# Patient Record
Sex: MALE | Race: WHITE | Employment: FULL TIME | URBAN - METROPOLITAN AREA
[De-identification: names, ages, dates, MRNs, and addresses within clinical notes are randomized per-mention and may not be internally consistent; named-entity substitution may affect disease eponyms.]

---

## 2018-10-19 ENCOUNTER — OFFICE VISIT (OUTPATIENT)
Dept: FAMILY MEDICINE | Facility: CLINIC | Age: 33
End: 2018-10-19
Payer: COMMERCIAL

## 2018-10-19 DIAGNOSIS — Z23 NEED FOR PROPHYLACTIC VACCINATION AND INOCULATION AGAINST INFLUENZA: ICD-10-CM

## 2018-10-19 DIAGNOSIS — L21.9 SEBORRHEIC DERMATITIS: Primary | ICD-10-CM

## 2018-10-19 DIAGNOSIS — Z23 NEED FOR PROPHYLACTIC VACCINATION WITH TETANUS-DIPHTHERIA (TD): ICD-10-CM

## 2018-10-19 DIAGNOSIS — R21 SKIN RASH: ICD-10-CM

## 2018-10-19 PROCEDURE — 99213 OFFICE O/P EST LOW 20 MIN: CPT | Performed by: FAMILY MEDICINE

## 2018-10-19 RX ORDER — CLOBETASOL PROPIONATE 0.5 MG/ML
SOLUTION TOPICAL
Refills: 3 | COMMUNITY
Start: 2017-12-22 | End: 2018-10-19

## 2018-10-19 RX ORDER — CLOBETASOL PROPIONATE 0.5 MG/ML
SOLUTION TOPICAL
Qty: 100 ML | Refills: 3 | Status: SHIPPED | OUTPATIENT
Start: 2018-10-19 | End: 2019-12-29

## 2018-10-19 RX ORDER — KETOCONAZOLE 20 MG/ML
SHAMPOO TOPICAL
Qty: 120 ML | Refills: 1 | Status: SHIPPED | OUTPATIENT
Start: 2018-10-19 | End: 2021-06-01

## 2018-10-19 NOTE — PATIENT INSTRUCTIONS
[facility]    If you have any questions regarding to your visit please contact your care team:       Team Purple:   Clinic Hours Telephone Number   Dr. Ute Burton   7am-7pm  Monday - Thursday   7am-5pm  Fridays  [phone]  (Appointment scheduling available 24/7)   Urgent Care - [place] and [facility] - 11am-9pm Monday-Friday Saturday-Sunday- 9am-5pm   [place] - 5pm-9pm Monday-Friday Saturday-Sunday- 9am-5pm  [phone] - [place]  [phone] - [place]       What options do I have for a visit other than an office visit? We offer electronic visits (e-visits) and telephone visits, when medically appropriate.  Please check with your medical insurance to see if these types of visits are covered, as you will be responsible for any charges that are not paid by your insurance.      You can use [employer] (secure electronic communication) to access to your chart, send your primary care provider a message, or make an appointment. Ask a team member how to get started.     For a price quote for your services, please call our Consumer Price Line at [phone] or our Imaging Cost estimation line at [phone] (for imaging tests).      ERIC Herrera

## 2018-10-19 NOTE — MR AVS SNAPSHOT
After Visit Summary   10/19/2018    Adam Alexander    MRN: [id number]           Patient Information     Date Of Birth          1985        Visit Information        Provider Department      10/19/2018 8:20 AM Jeevan Morrison MD [facility]        Today's Diagnoses     Seborrheic dermatitis    -  1    Need for prophylactic vaccination and inoculation against influenza        Need for prophylactic vaccination with tetanus-diphtheria (Td)        Skin rash          Care Instructions    [facility]    If you have any questions regarding to your visit please contact your care team:       Team Purple:   Clinic Hours Telephone Number   Dr. Ute Burton   7am-7pm  Monday - Thursday   7am-5pm  Fridays  [phone]  (Appointment scheduling available 24/7)   Urgent Care - [place] - 11am-9pm Monday-Friday Saturday-Sunday- 9am-5pm   [place] - 5pm-9pm Monday-Friday Saturday-Sunday- 9am-5pm  [phone] - [place]  [phone] [facility]       What options do I have for a visit other than an office visit? We offer electronic visits (e-visits) and telephone visits, when medically appropriate.  Please check with your medical insurance to see if these types of visits are covered, as you will be responsible for any charges that are not paid by your insurance.      You can use [email or web address] (secure electronic communication) to access to your chart, send your primary care provider a message, or make an appointment. Ask a team member how to get started.     For a price quote for your services, please call our Consumer Price Line at [phone] or our Imaging Cost estimation line at [phone] (for imaging tests).      ERIC Herrera            Follow-ups after your visit        Who to contact     If you have questions or need follow up information about today's clinic visit or your schedule please contact  [facility] directly at [phone].  Normal or non-critical lab and imaging results will be communicated to you by MyChart, letter or phone within 4 business days after the clinic has received the results. If you do not hear from us within 7 days, please contact the clinic through MyChart or phone. If you have a critical or abnormal lab result, we will notify you by phone as soon as possible.  Submit refill requests through [employer] or call your pharmacy and they will forward the refill request to us. Please allow 3 business days for your refill to be completed.          Additional Information About Your Visit        Care EveryWhere ID     This is your Care EveryWhere ID. This could be used by other organizations to access your [place] medical records  [id number]         Blood Pressure from Last 3 Encounters:   01/07/16 133/72   12/22/15 120/79   09/23/15 124/79    Weight from Last 3 Encounters:   01/07/16 148 lb 3.2 oz (67.2 kg)   12/22/15 150 lb (68 kg)   06/22/15 158 lb (71.7 kg)              Today, you had the following     No orders found for display         Today's Medication Changes          These changes are accurate as of 10/19/18  8:38 AM.  If you have any questions, ask your nurse or doctor.               Stop taking these medicines if you haven't already. Please contact your care team if you have questions.     desonide 0.05 % cream   Commonly known as:  DESOWEN                Where to get your medicines      These medications were sent to [facility] - [place], [place] - [address] [address]  [address] [address] [address]    Hours:  24-hours Phone:  [phone]     clobetasol 0.05 % external solution    ketoconazole 2 % shampoo                Primary Care Provider Office Phone # Fax #    [facility] [phone] [address] [address]        Equal Access to Services      KHRIS ERAZO : Hadii aad ku litzy Richter, waaxda luqadaha, qaybta kaalzoraida brothers, hermes parth ashwinnatty jamestamracaroline pro. [facility] [phone].    ATENCIÓN: Si habla español, tiene a bowers disposición servicios gratuitos de asistencia lingüística. Llame al [phone].    We comply with applicable federal civil rights laws and [place] laws. We do not discriminate on the basis of race, color, national origin, age, disability, sex, sexual orientation, or gender identity.            Thank you!     Thank you for choosing [facility] [place]  for your care. Our goal is always to provide you with excellent care. Hearing back from our patients is one way we can continue to improve our services. Please take a few minutes to complete the written survey that you may receive in the mail after your visit with us. Thank you!             Your Updated Medication List - Protect others around you: Learn how to safely use, store and throw away your medicines at www.disposemymeds.org.          This list is accurate as of 10/19/18  8:38 AM.  Always use your most recent med list.                   Brand Name Dispense Instructions for use Diagnosis    betamethasone (augmented) 0.05 % lotion    DIPROLENE          clobetasol 0.05 % external solution    TEMOVATE    100 mL    LETICIA TOPICALLY BID TO SCALP UNDER WET DRESSINGS    Seborrheic dermatitis       ketoconazole 2 % shampoo    NIZORAL    120 mL    Apply to the affected area and wash off after 5 minutes.    Seborrheic dermatitis

## 2018-10-19 NOTE — PROGRESS NOTES
SUBJECTIVE:   Adam Alexander is a 33 year old male who presents to clinic today for the following health issues:    Chief Complaint   Patient presents with     Derm Problem     scalp on face and scabs for years, comes and go. Pt reports itchy scabs, scalp under hair, dryness, rediness and irritated scab. Pt also reports he was treating for this in the past. He used clobetasol (TEMOVATE) 0.05 % external  witch helped with the scalp. Pt is wanted refill on (TEMOVATE)      Health Maintenance     PHQ-2, Tetanus immunization, influenza     Has had skin issues since 2014 and been treated with different medications, only Clobetasol seems to help. Has build up in the scalp and the face gets dry, flaky and itchy. It comes and goes. Seen in [facility] dermatologist; clobetasol and other lotions.    Problem list and histories reviewed & adjusted, as indicated.  Additional history: as documented    Patient Active Problem List   Diagnosis     Hyperlipidemia with target LDL less than 160     AD (atopic dermatitis)     Past Surgical History:   Procedure Laterality Date     MYRINGOTOMY, INSERT TUBE BILATERAL, COMBINED         Social History   Substance Use Topics     Smoking status: Never Smoker     Smokeless tobacco: Never Used     Alcohol use Yes      Comment: occ     Family History   Problem Relation Age of Onset     Hyperlipidemia Father          ROS:  Constitutional, HEENT, cardiovascular, pulmonary, gi and gu systems are negative, except as otherwise noted.    OBJECTIVE:     /72  There is no height or weight on file to calculate BMI.  GENERAL: healthy, alert and no distress  RESP: lungs clear to auscultation - no rales, rhonchi or wheezes  CV: regular rate and rhythm, normal S1 S2, no S3 or S4, no murmur, click or rub  SKIN:  Scaly dry skin: scalp and face - face and scalp    Diagnostic Test Results:  none     ASSESSMENT/PLAN:     (L21.9) Seborrheic dermatitis  (primary encounter diagnosis)  Comment: Consistent  with seborrheic dermatitis/Xerosis. Discussed use of Clobetasol and Nizoral shampoo. Keep skin moist  Plan: ketoconazole (NIZORAL) 2 % shampoo, clobetasol         (TEMOVATE) 0.05 % external solution    (Z23) Need for prophylactic vaccination and inoculation against influenza  (Z23) Need for prophylactic vaccination with tetanus-diphtheria (Td)    Jeevan Morrison MD  [facility]

## 2018-10-21 VITALS — DIASTOLIC BLOOD PRESSURE: 72 MMHG | SYSTOLIC BLOOD PRESSURE: 130 MMHG

## 2019-02-26 ENCOUNTER — OFFICE VISIT (OUTPATIENT)
Dept: FAMILY MEDICINE | Facility: CLINIC | Age: 34
End: 2019-02-26
Payer: COMMERCIAL

## 2019-02-26 VITALS
WEIGHT: 164 LBS | SYSTOLIC BLOOD PRESSURE: 104 MMHG | TEMPERATURE: 97 F | DIASTOLIC BLOOD PRESSURE: 64 MMHG | HEIGHT: 70 IN | OXYGEN SATURATION: 100 % | RESPIRATION RATE: 12 BRPM | HEART RATE: 95 BPM | BODY MASS INDEX: 23.48 KG/M2

## 2019-02-26 DIAGNOSIS — H93.8X3 PLUGGED FEELING IN EAR, BILATERAL: Primary | ICD-10-CM

## 2019-02-26 DIAGNOSIS — T16.2XXA FOREIGN BODY OF LEFT EAR, INITIAL ENCOUNTER: ICD-10-CM

## 2019-02-26 PROCEDURE — 99213 OFFICE O/P EST LOW 20 MIN: CPT | Performed by: FAMILY MEDICINE

## 2019-02-26 ASSESSMENT — MIFFLIN-ST. JEOR: SCORE: 1700.15

## 2019-02-26 NOTE — NURSING NOTE
"Chief Complaint   Patient presents with     Derm Problem     Initial /64 (BP Location: Left arm, Patient Position: Chair, Cuff Size: Adult Regular)   Pulse 95   Temp 97  F (36.1  C) (Oral)   Resp 12   Ht 1.778 m (5' 10\")   Wt 74.4 kg (164 lb)   SpO2 100%   BMI 23.53 kg/m   Estimated body mass index is 23.53 kg/m  as calculated from the following:    Height as of this encounter: 1.778 m (5' 10\").    Weight as of this encounter: 74.4 kg (164 lb).  BP completed using cuff size: regular    Maurizio Hopper  "

## 2019-02-26 NOTE — PROGRESS NOTES
"  SUBJECTIVE:   Adam Alexander is a 33 year old male who presents to clinic today for the following health issues:    Chief Complaint   Patient presents with     Derm Problem     Swelling thats effecting the ears feeling plugged    Has had similar symptoms in the past, got ear drops and that helped.    At this time ears feeling plugged up for past several weeks  No pain, hearing decreased.  No cough, nasal congestion or seasonal allergies.  He uses Q tips to clean his ears.    Problem list and histories reviewed & adjusted, as indicated.  Additional history: as documented    Patient Active Problem List   Diagnosis     Hyperlipidemia with target LDL less than 160     AD (atopic dermatitis)     Past Surgical History:   Procedure Laterality Date     MYRINGOTOMY, INSERT TUBE BILATERAL, COMBINED         Social History     Tobacco Use     Smoking status: Never Smoker     Smokeless tobacco: Never Used   Substance Use Topics     Alcohol use: Yes     Comment: occ     Family History   Problem Relation Age of Onset     Hyperlipidemia Father          Tobacco  Allergies  Meds  Med Hx  Surg Hx  Fam Hx  Soc Hx      Reviewed and updated as needed this visit by Provider    ROS:  Constitutional, cardiovascular, pulmonary, gi and gu systems are negative, except as otherwise noted.    OBJECTIVE:     /64 (BP Location: Left arm, Patient Position: Chair, Cuff Size: Adult Regular)   Pulse 95   Temp 97  F (36.1  C) (Oral)   Resp 12   Ht 1.778 m (5' 10\")   Wt 74.4 kg (164 lb)   SpO2 100%   BMI 23.53 kg/m    Body mass index is 23.53 kg/m .  GENERAL: healthy, alert and no distress  HEENT:   Rt Ear: TM whitish in lower quadrant  Lt Ear canal: White stuff deep in the ear canal; looks like tip cotton piece) of Qtip  NECK: no adenopathy and thyroid normal to palpation  RESP: lungs clear to auscultation - no rales, rhonchi or wheezes  CV: regular rate and rhythm, normal S1 S2, no S3 or S4, no murmur, click or rub.    Diagnostic " Test Results:  none     ASSESSMENT/PLAN:     (H93.8X3) Plugged feeling in ear, bilateral  (primary encounter diagnosis)  Comment: Difefrentials: ETD, FB, URI.  Plan: OTOLARYNGOLOGY REFERRAL    (T16.2XXA) Foreign body of left ear, initial encounter  Comment: Given persistence of symptoms and finding in left ear, discussed evaluation by ENT  Plan: OTOLARYNGOLOGY REFERRAL    Jeevan Morrison MD  [facility]

## 2019-03-07 ENCOUNTER — OFFICE VISIT (OUTPATIENT)
Dept: OTOLARYNGOLOGY | Facility: CLINIC | Age: 34
End: 2019-03-07
Payer: COMMERCIAL

## 2019-03-07 ENCOUNTER — OFFICE VISIT (OUTPATIENT)
Dept: AUDIOLOGY | Facility: CLINIC | Age: 34
End: 2019-03-07
Payer: COMMERCIAL

## 2019-03-07 VITALS
HEIGHT: 70 IN | BODY MASS INDEX: 23.48 KG/M2 | SYSTOLIC BLOOD PRESSURE: 119 MMHG | OXYGEN SATURATION: 100 % | DIASTOLIC BLOOD PRESSURE: 75 MMHG | WEIGHT: 164 LBS | HEART RATE: 69 BPM

## 2019-03-07 DIAGNOSIS — T16.2XXA FOREIGN BODY OF LEFT EAR, INITIAL ENCOUNTER: ICD-10-CM

## 2019-03-07 DIAGNOSIS — H93.8X3 PLUGGED FEELING IN EAR, BILATERAL: Primary | ICD-10-CM

## 2019-03-07 DIAGNOSIS — H60.543 DERMATITIS OF BOTH EAR CANALS: Primary | ICD-10-CM

## 2019-03-07 PROCEDURE — 69200 CLEAR OUTER EAR CANAL: CPT | Performed by: OTOLARYNGOLOGY

## 2019-03-07 PROCEDURE — 99207 ZZC NO CHARGE LOS: CPT | Performed by: AUDIOLOGIST

## 2019-03-07 PROCEDURE — 99203 OFFICE O/P NEW LOW 30 MIN: CPT | Mod: 25 | Performed by: OTOLARYNGOLOGY

## 2019-03-07 ASSESSMENT — MIFFLIN-ST. JEOR: SCORE: 1700.15

## 2019-03-07 NOTE — PROGRESS NOTES
AUDIOLOGY REPORT:    Patient was referred from ENT by Dr. Newman for audiology evaluation. Patient reports that both of his ears have been plugged for about one month. They feel swollen. Patient states that this has happened before, and he has been treated successfully with ear drops. He also reports bilateral tinnitus. He denies otalgia and otorrhea.     Testing:    Otoscopy:   Otoscopic exam indicates both ear canals quite swollen. Difficult to visualize tympanic membrane.     Given results of otoscopy, further testing was deferred and patient was brought to ENT for evaluation by Dr. Newman. Patient did not return to audiology.    Celeste Wylie, CCC-A  Licensed Audiologist #[id number]  3/7/2019

## 2019-03-07 NOTE — PATIENT INSTRUCTIONS
Scheduling Information  To schedule your CT/MRI scan, please contact [facility] at [phone] OR [facility] at [phone]    To schedule your Surgery, please contact our Specialty Schedulers at [phone]    ** If a CT scan or biopsy were ordered/done, Dr. Newman will need to see you back in clinic to go over your biopsy results or CT/MRI scan. He will go over the images from your scan with you and discuss treatment based on your results.     ENT Clinic Locations Clinic Hours Telephone Number     [facility] [place]  [address]  [place] [address]   E/O Thursday:      7:30am -- 4:00pm   To schedule/reschedule an appointment with   Dr. Newman,   please contact our   Specialty Scheduling Department at:     [phone]       [facility] [place]  [address] [place] [address]     Monday:              12:00pm -- 4:00pm    Tuesday:               8:30am -- 4:30pm    Wednesday:        12:00pm -- 4:00pm    E/O Thursday:        8:00am - 4:30pm           Urgent Care Locations Clinic Hours Telephone Numbers     [facility] [place]  [address]  [place] [address]     Monday-Friday:     11:00am - 9:00pm    Saturday-Sunday:  9:00am - 5:00pm   [phone]     [facility]  [address] [place] [address]     Monday-Friday:      5:00pm - 9:00pm     Saturday-Sunday:  9:00am - 5:00pm   [phone]

## 2019-03-07 NOTE — LETTER
3/7/2019         RE: Adam Alexander  [address] [address]        Dear Colleague,    Thank you for referring your patient, Adam Alexander, to the [facility]. Please see a copy of my visit note below.        History of Present Illness - Adam Alexander is a 33 year old male who presents with a sensation that his ears are plugged. He has a dermatologic condition that is felt to be a seborrheic dermatitis that affects his scalp as well as his ear canals. He does use qtips after showering. He denies prior ear surgery except PE tubes in childhood. He denies otalgia.    Past Medical History -   Patient Active Problem List   Diagnosis     Hyperlipidemia with target LDL less than 160     AD (atopic dermatitis)       Current Medications -   Current Outpatient Medications:      betamethasone, augmented, (DIPROLENE) 0.05 % lotion, , Disp: , Rfl: 6     clobetasol (TEMOVATE) 0.05 % external solution, LETICIA TOPICALLY BID TO SCALP UNDER WET DRESSINGS, Disp: 100 mL, Rfl: 3     ketoconazole (NIZORAL) 2 % shampoo, Apply to the affected area and wash off after 5 minutes., Disp: 120 mL, Rfl: 1    Allergies -   Allergies   Allergen Reactions     Amoxicillin Rash     Minocycline Rash       Social History -   Social History     Socioeconomic History     Marital status: Single     Spouse name: None     Number of children: None     Years of education: None     Highest education level: None   Occupational History     None   Social Needs     Financial resource strain: None     Food insecurity:     Worry: None     Inability: None     Transportation needs:     Medical: None     Non-medical: None   Tobacco Use     Smoking status: Never Smoker     Smokeless tobacco: Never Used   Substance and Sexual Activity     Alcohol use: Yes     Comment: occ     Drug use: No     Sexual activity: Yes     Partners: Female   Lifestyle     Physical activity:     Days per week: None     Minutes per session: None     Stress: None  "  Relationships     Social connections:     Talks on phone: None     Gets together: None     Attends Shinto service: None     Active member of club or organization: None     Attends meetings of clubs or organizations: None     Relationship status: None     Intimate partner violence:     Fear of current or ex partner: None     Emotionally abused: None     Physically abused: None     Forced sexual activity: None   Other Topics Concern     Parent/sibling w/ CABG, MI or angioplasty before 65F 55M? Not Asked   Social History Narrative     None       Family History -   Family History   Problem Relation Age of Onset     Hyperlipidemia Father        Review of Systems - As per HPI and PMHx, otherwise 7 system review of the head and neck negative. 10+ system review negative.    Physical Exam  /75   Pulse 69   Ht 1.778 m (5' 10\")   Wt 74.4 kg (164 lb)   SpO2 100%   BMI 23.53 kg/m     General - The patient is well nourished and well developed, and appears to have good nutritional status.  Alert and oriented to person and place, answers questions and cooperates with examination appropriately.   Head and Face - Normocephalic and atraumatic, with no gross asymmetry noted of the contour of the facial features.  The facial nerve is intact, with strong symmetric movements.  Voice and Breathing - The patient was breathing comfortably without the use of accessory muscles. There was no wheezing, stridor, or stertor.  The patients voice was clear and strong, and had appropriate pitch and quality.  Ears - introitus of the ear canal bilaterally with crusty fissured skin but without surrounding erythema. Ear canal on the right with yellow green thick liquid which was suctioned away to reveal an intact and mobile TM. Left ear canal also contained similar debris, but also an impacted piece of cotton was removed. The left TM was thickened but appeared intact and mobile.  Eyes - Extraocular movements intact.  Sclera were not " icteric or injected, conjunctiva were pink and moist.  Mouth - Examination of the oral cavity showed pink, healthy oral mucosa. No lesions or ulcerations noted.  The tongue was mobile and midline, and the dentition were in good condition.    Throat - The walls of the oropharynx were smooth, pink, moist, symmetric, and had no lesions or ulcerations.  The tonsillar pillars and soft palate were symmetric.  The uvula was midline on elevation.  Neck - Normal midline excursion of the laryngotracheal complex during swallowing.  Full range of motion on passive movement.  Palpation of the occipital, submental, submandibular, internal jugular chain, and supraclavicular nodes did not demonstrate any abnormal lymph nodes or masses.  The carotid pulse was palpable bilaterally.  Palpation of the thyroid was soft and smooth, with no nodules or goiter appreciated.  The trachea was mobile and midline.  Nose - External contour is symmetric, no gross deflection or scars.      Procedure: Removal of Ear Foreign Body, left  Indication: Ear Foreign Body  Technique: The patient was positioned such that the left ear canal was clearly visualized under the microscope. A series of picks and forceps were then used to carefully remove the object from the ear canal. The patient tolerated the procedure well and there was no evidence of trauma to the ear canal following the procedure.            Assessment - Adam Alexander is a 33 year old male with a longstanding dermatitis of the ear canal openings, as well as thick otorrhea and a left ear canal foreign body consistent with cotton. I cleaned his ears thoroughly today. I recommended he apply his clobetasol solution to the ear canals daily after showering. Return in 1 month.       Dr. Kristan Newman MD  Otolaryngology  [facility]        Again, thank you for allowing me to participate in the care of your patient.        Sincerely,        Kristan Newman MD

## 2019-03-08 NOTE — PROGRESS NOTES
History of Present Illness - Adam Alexander is a 33 year old male who presents with a sensation that his ears are plugged. He has a dermatologic condition that is felt to be a seborrheic dermatitis that affects his scalp as well as his ear canals. He does use qtips after showering. He denies prior ear surgery except PE tubes in childhood. He denies otalgia.    Past Medical History -   Patient Active Problem List   Diagnosis     Hyperlipidemia with target LDL less than 160     AD (atopic dermatitis)       Current Medications -   Current Outpatient Medications:      betamethasone, augmented, (DIPROLENE) 0.05 % lotion, , Disp: , Rfl: 6     clobetasol (TEMOVATE) 0.05 % external solution, LETICIA TOPICALLY BID TO SCALP UNDER WET DRESSINGS, Disp: 100 mL, Rfl: 3     ketoconazole (NIZORAL) 2 % shampoo, Apply to the affected area and wash off after 5 minutes., Disp: 120 mL, Rfl: 1    Allergies -   Allergies   Allergen Reactions     Amoxicillin Rash     Minocycline Rash       Social History -   Social History     Socioeconomic History     Marital status: Single     Spouse name: None     Number of children: None     Years of education: None     Highest education level: None   Occupational History     None   Social Needs     Financial resource strain: None     Food insecurity:     Worry: None     Inability: None     Transportation needs:     Medical: None     Non-medical: None   Tobacco Use     Smoking status: Never Smoker     Smokeless tobacco: Never Used   Substance and Sexual Activity     Alcohol use: Yes     Comment: occ     Drug use: No     Sexual activity: Yes     Partners: Female   Lifestyle     Physical activity:     Days per week: None     Minutes per session: None     Stress: None   Relationships     Social connections:     Talks on phone: None     Gets together: None     Attends Taoist service: None     Active member of club or organization: None     Attends meetings of clubs or organizations: None      "Relationship status: None     Intimate partner violence:     Fear of current or ex partner: None     Emotionally abused: None     Physically abused: None     Forced sexual activity: None   Other Topics Concern     Parent/sibling w/ CABG, MI or angioplasty before 65F 55M? Not Asked   Social History Narrative     None       Family History -   Family History   Problem Relation Age of Onset     Hyperlipidemia Father        Review of Systems - As per HPI and PMHx, otherwise 7 system review of the head and neck negative. 10+ system review negative.    Physical Exam  /75   Pulse 69   Ht 1.778 m (5' 10\")   Wt 74.4 kg (164 lb)   SpO2 100%   BMI 23.53 kg/m    General - The patient is well nourished and well developed, and appears to have good nutritional status.  Alert and oriented to person and place, answers questions and cooperates with examination appropriately.   Head and Face - Normocephalic and atraumatic, with no gross asymmetry noted of the contour of the facial features.  The facial nerve is intact, with strong symmetric movements.  Voice and Breathing - The patient was breathing comfortably without the use of accessory muscles. There was no wheezing, stridor, or stertor.  The patients voice was clear and strong, and had appropriate pitch and quality.  Ears - introitus of the ear canal bilaterally with crusty fissured skin but without surrounding erythema. Ear canal on the right with yellow green thick liquid which was suctioned away to reveal an intact and mobile TM. Left ear canal also contained similar debris, but also an impacted piece of cotton was removed. The left TM was thickened but appeared intact and mobile.  Eyes - Extraocular movements intact.  Sclera were not icteric or injected, conjunctiva were pink and moist.  Mouth - Examination of the oral cavity showed pink, healthy oral mucosa. No lesions or ulcerations noted.  The tongue was mobile and midline, and the dentition were in good " condition.    Throat - The walls of the oropharynx were smooth, pink, moist, symmetric, and had no lesions or ulcerations.  The tonsillar pillars and soft palate were symmetric.  The uvula was midline on elevation.  Neck - Normal midline excursion of the laryngotracheal complex during swallowing.  Full range of motion on passive movement.  Palpation of the occipital, submental, submandibular, internal jugular chain, and supraclavicular nodes did not demonstrate any abnormal lymph nodes or masses.  The carotid pulse was palpable bilaterally.  Palpation of the thyroid was soft and smooth, with no nodules or goiter appreciated.  The trachea was mobile and midline.  Nose - External contour is symmetric, no gross deflection or scars.      Procedure: Removal of Ear Foreign Body, left  Indication: Ear Foreign Body  Technique: The patient was positioned such that the left ear canal was clearly visualized under the microscope. A series of picks and forceps were then used to carefully remove the object from the ear canal. The patient tolerated the procedure well and there was no evidence of trauma to the ear canal following the procedure.            Assessment - Adam Alexander is a 33 year old male with a longstanding dermatitis of the ear canal openings, as well as thick otorrhea and a left ear canal foreign body consistent with cotton. I cleaned his ears thoroughly today. I recommended he apply his clobetasol solution to the ear canals daily after showering. Return in 1 month.       Dr. Kristan Newman MD  Otolaryngology  [facility]

## 2019-12-26 DIAGNOSIS — L21.9 SEBORRHEIC DERMATITIS: ICD-10-CM

## 2019-12-29 RX ORDER — CLOBETASOL PROPIONATE 0.5 MG/ML
SOLUTION TOPICAL
Qty: 100 ML | Refills: 0 | Status: SHIPPED | OUTPATIENT
Start: 2019-12-29

## 2021-06-01 ENCOUNTER — OFFICE VISIT (OUTPATIENT)
Dept: FAMILY MEDICINE | Facility: CLINIC | Age: 36
End: 2021-06-01
Payer: COMMERCIAL

## 2021-06-01 VITALS
HEART RATE: 68 BPM | RESPIRATION RATE: 20 BRPM | TEMPERATURE: 98.4 F | WEIGHT: 155.4 LBS | SYSTOLIC BLOOD PRESSURE: 98 MMHG | BODY MASS INDEX: 22.3 KG/M2 | DIASTOLIC BLOOD PRESSURE: 78 MMHG

## 2021-06-01 DIAGNOSIS — T16.2XXA FOREIGN BODY OF LEFT EAR, INITIAL ENCOUNTER: Primary | ICD-10-CM

## 2021-06-01 PROCEDURE — 99213 OFFICE O/P EST LOW 20 MIN: CPT | Performed by: FAMILY MEDICINE

## 2021-06-01 RX ORDER — TOBRAMYCIN AND DEXAMETHASONE 3; 1 MG/ML; MG/ML
SUSPENSION/ DROPS OPHTHALMIC
COMMUNITY
Start: 2021-04-13

## 2021-06-01 ASSESSMENT — ENCOUNTER SYMPTOMS
CHILLS: 0
HEADACHES: 0
WEAKNESS: 0
JOINT SWELLING: 0
COUGH: 0
NERVOUS/ANXIOUS: 0
PALPITATIONS: 0
FEVER: 0
PARESTHESIAS: 0
MYALGIAS: 0
DIZZINESS: 0
ARTHRALGIAS: 0
SHORTNESS OF BREATH: 0
SORE THROAT: 0

## 2021-06-01 ASSESSMENT — PAIN SCALES - GENERAL: PAINLEVEL: NO PAIN (0)

## 2021-06-01 NOTE — PATIENT INSTRUCTIONS
Aleta Medina,    Thank you for allowing [facility] to manage your care.    I made an ENT referral, please keep your appointment tomorrow.      If you have any questions or concerns, please feel free to call us at [phone].    Sincerely,    Dr. Moreno    Did you know?      You can schedule a video visit for follow-up appointments as well as future appointments for certain conditions.  Please see the below link.     [email or web address]    If you have not already done so,  I encourage you to sign up for [facility] ([email or web address]  This will allow you to review your results, securely communicate with a provider, and schedule virtual visits as well.

## 2021-06-01 NOTE — PROGRESS NOTES
Assessment & Plan     Foreign body of left ear, initial encounter  Concerning for retained qtip.  History of severe generalized seborrheic dermatitis.  Will defer to ENT in regards to extraction.  - OTOLARYNGOLOGY REFERRAL     See Patient Instructions    Return in about 1 week (around 6/8/2021), or if symptoms worsen or fail to improve.    Phil Moreno DO  [facility]    Subjective     Skin issues history, can affect ascalp and ears  - ears swell up, hard to hear, can't tell if wax or       clebetasol topical solution  - on qtip and put in ear  - left ear, qtip cotton stuck in ear  - unsure of specific diagnosis for skin condition, some kind of dermatitis.      Question: get cotton out, and do you have to use clebetasol on qtip?    Has had Covid vaccination, going to wedding in [place] soon so would like this resolved by then    ROS:    No fever, chills, cough, no CP, ABD pain, muscle pain, no SOB. No changes in vision. No change in hearing outside of normal decrease from swelling.     Lung sounds clear, no crackles or wheezing  Heart sounds normal, S1, S2.    Both ears visibly swollen with swollen canal. Right ear tympanic membrane not visible. Left ear has a piece of cotton deep inside the ear.          Adam is a 35 year old who presents for the following health issues     HPI     Chief Complaint   Patient presents with     Ear Problem     Has inflamed skin on ears, scalp and eyes. Tried applying some of his clobetasol ointment in ears with q-tip. He uses the ointment for his scalp and eye and was told he can also use it for his ears. Thinks there is a piece of cotton stuck in his left ear.    Agree with above Medical Student Note    1. Left ear foreign body: History of ear dermatitis treated in the past with clobetasol.  Was previously seen by ENT.  Last Saturday, patient had a flare up in regards to ear swelling and dry skin.  However, states that tip of cotton swab remained in the  canal.  No pain.  No drainage.  States of decrease drainage involving the canal.  Mild ringing of the ears.  Denies any imbalance.  No fevers or chills.     Review of Systems   Constitutional: Negative for chills and fever.   HENT: Positive for ear pain (left). Negative for congestion, hearing loss and sore throat.    Respiratory: Negative for cough and shortness of breath.    Cardiovascular: Negative for chest pain, palpitations and peripheral edema.   Musculoskeletal: Negative for arthralgias, joint swelling and myalgias.   Skin: Negative for rash.   Neurological: Negative for dizziness, weakness, headaches and paresthesias.   Psychiatric/Behavioral: Negative for mood changes. The patient is not nervous/anxious.         Objective    BP 98/78 (BP Location: Left arm, Patient Position: Sitting, Cuff Size: Adult Regular)   Pulse 68   Temp 98.4  F (36.9  C) (Tympanic)   Resp 20   Wt 70.5 kg (155 lb 6.4 oz)   BMI 22.30 kg/m    Body mass index is 22.3 kg/m .  Physical Exam  Constitutional:       General: He is not in acute distress.     Appearance: He is well-developed.   HENT:      Head: Normocephalic and atraumatic.      Ears:      Comments: Mild bilateral swelling involving the bilateral pinna.  Left ear: foreign body in ear canal.  Right ear: difficulty to visualize the TM     Nose: Nose normal.   Eyes:      Conjunctiva/sclera: Conjunctivae normal.   Neck:      Musculoskeletal: Normal range of motion.      Trachea: No tracheal deviation.   Cardiovascular:      Rate and Rhythm: Normal rate and regular rhythm.      Heart sounds: Normal heart sounds.   Pulmonary:      Effort: Pulmonary effort is normal.      Breath sounds: No wheezing.   Musculoskeletal: Normal range of motion.   Skin:     Findings: No erythema or rash.   Neurological:      Mental Status: He is alert and oriented to person, place, and time.   Psychiatric:         Behavior: Behavior normal.

## 2021-06-02 ENCOUNTER — OFFICE VISIT (OUTPATIENT)
Dept: OTOLARYNGOLOGY | Facility: CLINIC | Age: 36
End: 2021-06-02
Payer: COMMERCIAL

## 2021-06-02 VITALS
SYSTOLIC BLOOD PRESSURE: 120 MMHG | HEART RATE: 67 BPM | BODY MASS INDEX: 22.53 KG/M2 | RESPIRATION RATE: 16 BRPM | DIASTOLIC BLOOD PRESSURE: 77 MMHG | OXYGEN SATURATION: 96 % | WEIGHT: 157 LBS

## 2021-06-02 DIAGNOSIS — H61.23 BILATERAL IMPACTED CERUMEN: ICD-10-CM

## 2021-06-02 DIAGNOSIS — H60.8X3 CHRONIC ECZEMATOUS OTITIS EXTERNA OF BOTH EARS: Primary | ICD-10-CM

## 2021-06-02 PROCEDURE — 69210 REMOVE IMPACTED EAR WAX UNI: CPT | Mod: LT | Performed by: OTOLARYNGOLOGY

## 2021-06-02 PROCEDURE — 69210 REMOVE IMPACTED EAR WAX UNI: CPT | Mod: RT | Performed by: OTOLARYNGOLOGY

## 2021-06-02 PROCEDURE — 99213 OFFICE O/P EST LOW 20 MIN: CPT | Mod: 25 | Performed by: OTOLARYNGOLOGY

## 2021-06-02 RX ORDER — FLUOCINOLONE ACETONIDE 0.11 MG/ML
OIL AURICULAR (OTIC)
Qty: 20 ML | Refills: 1 | Status: SHIPPED | OUTPATIENT
Start: 2021-06-02

## 2021-06-02 NOTE — LETTER
6/2/2021         RE: Adam Alexander  [id number] [facility] [address]        Dear Colleague,    Thank you for referring your patient, Adam Alexander, to the [facility]. Please see a copy of my visit note below.    I am seeing this patient in consultation for foreign body left ear at the request of the provider Dr. Phil Moreno      Chief Complaint - foreign body in left ear    History of Present Illness - Adam Alexander is a 35 year old male who presents to me today for skin problems in ears. Has a skin issue, possibly seborrheic dermatitis. It affects his ears. He itches them with a q-tip and got a q-tip stuck in his left ear. No ear infections. No drainage or pain. He has more irritation. Sometimes tinnitus.     Past Medical History -   Patient Active Problem List   Diagnosis     Hyperlipidemia with target LDL less than 160     AD (atopic dermatitis)       Physical Exam  /77   Pulse 67   Resp 16   Wt 71.2 kg (157 lb)   SpO2 96%   BMI 22.53 kg/m    General - The patient is in no distress.  Alert and oriented to person and place, answers questions and cooperates with examination appropriately.   Voice and Breathing - The patient was breathing comfortably without the use of accessory muscles. There was no wheezing, stridor, or stertor.  The patients voice was clear and strong.  Ears - The auricles are crusted with dry and cracked skin and sloughing. Both ear canals were narrowed and edematous. Also both ear canals were impacted with cerumen, and the left had a q-tip head in it. See below for the procedure. Once the cerumen was removed the tympanic membranes are normal in appearance, bony landmarks are intact.  No retraction, perforation, or masses. Some moisture both ear canals.   Neck - Soft, nontender. Palpation of the occipital, submental, submandibular, internal jugular chain, and supraclavicular nodes did not demonstrate any abnormal lymph nodes or masses.  Parotid glands were without masses.  The trachea was mobile and midline.  Neurological - Cranial nerves 2 through 12 were grossly intact. House-Brackmann grade 1 out of 6 bilaterally.     Cerumen Removal    Physical Exam and Procedure  Ears - On examination of the ears, I found that both ears were impacted with cerumen, and the left had a q-tip in it. Therefore, I positioned the patient in the examination chair in a semi-supine position. I used the binocular surgical microscope to perform cerumen removal.  On the right side, I began by using a suction to gently lift the edges of the cerumen mass away from the walls of the external canal.  Once I did this, I was able to suction away fragments of wax and debris.  I removed all the wax and debri. The tympanic membrane was intact, no sign of perforation or middle ear effusion.    I turned my attention to the left side once again using the binocular surgical microscope to perform cerumen removal.  I began by using an alligator to remove the left ear canal q-tip. Next I used a suction to gently lift the edges of the cerumen away from the walls of the external canal.  Once I did this, I was able to suction away fragments of wax and debris. I removed all wax and debri. The tympanic membrane was intact, no sign of perforation or middle ear effusion.      Assessment and Plan - Adam Alexander is a 35 year old male who presents to me today with significant chronic eczematous otitis externa.  He has a skin condition possibly seborrheic dermatitis in different parts of his body and affecting his ears.  I cleaned both ears including removing a Q-tip head from the left ear canal.  He has narrow ear canals with inflamed skin, cracking, and sloughing.  I recommend Derm otic.  He can also continue to use the clobetasol intermittently.  Return as needed especially if he suspects infection such as pain and drainage.    Bry Narvaez MD  Otolaryngology  [facility]        Again,  thank you for allowing me to participate in the care of your patient.        Sincerely,        Bry Narvaez MD

## 2021-06-02 NOTE — PROGRESS NOTES
I am seeing this patient in consultation for foreign body left ear at the request of the provider Dr. Phil Moreno      Chief Complaint - foreign body in left ear    History of Present Illness - Adam Alexander is a 35 year old male who presents to me today for skin problems in ears. Has a skin issue, possibly seborrheic dermatitis. It affects his ears. He itches them with a q-tip and got a q-tip stuck in his left ear. No ear infections. No drainage or pain. He has more irritation. Sometimes tinnitus.     Past Medical History -   Patient Active Problem List   Diagnosis     Hyperlipidemia with target LDL less than 160     AD (atopic dermatitis)       Physical Exam  /77   Pulse 67   Resp 16   Wt 71.2 kg (157 lb)   SpO2 96%   BMI 22.53 kg/m    General - The patient is in no distress.  Alert and oriented to person and place, answers questions and cooperates with examination appropriately.   Voice and Breathing - The patient was breathing comfortably without the use of accessory muscles. There was no wheezing, stridor, or stertor.  The patients voice was clear and strong.  Ears - The auricles are crusted with dry and cracked skin and sloughing. Both ear canals were narrowed and edematous. Also both ear canals were impacted with cerumen, and the left had a q-tip head in it. See below for the procedure. Once the cerumen was removed the tympanic membranes are normal in appearance, bony landmarks are intact.  No retraction, perforation, or masses. Some moisture both ear canals.   Neck - Soft, nontender. Palpation of the occipital, submental, submandibular, internal jugular chain, and supraclavicular nodes did not demonstrate any abnormal lymph nodes or masses. Parotid glands were without masses.  The trachea was mobile and midline.  Neurological - Cranial nerves 2 through 12 were grossly intact. House-Brackmann grade 1 out of 6 bilaterally.     Cerumen Removal    Physical Exam and Procedure  Ears - On examination  of the ears, I found that both ears were impacted with cerumen, and the left had a q-tip in it. Therefore, I positioned the patient in the examination chair in a semi-supine position. I used the binocular surgical microscope to perform cerumen removal.  On the right side, I began by using a suction to gently lift the edges of the cerumen mass away from the walls of the external canal.  Once I did this, I was able to suction away fragments of wax and debris.  I removed all the wax and debri. The tympanic membrane was intact, no sign of perforation or middle ear effusion.    I turned my attention to the left side once again using the binocular surgical microscope to perform cerumen removal.  I began by using an alligator to remove the left ear canal q-tip. Next I used a suction to gently lift the edges of the cerumen away from the walls of the external canal.  Once I did this, I was able to suction away fragments of wax and debris. I removed all wax and debri. The tympanic membrane was intact, no sign of perforation or middle ear effusion.      Assessment and Plan - Adam Alexander is a 35 year old male who presents to me today with significant chronic eczematous otitis externa.  He has a skin condition possibly seborrheic dermatitis in different parts of his body and affecting his ears.  I cleaned both ears including removing a Q-tip head from the left ear canal.  He has narrow ear canals with inflamed skin, cracking, and sloughing.  I recommend Derm otic.  He can also continue to use the clobetasol intermittently.  Return as needed especially if he suspects infection such as pain and drainage.    Bry Narvaez MD  Otolaryngology  [facility]

## 2024-12-30 DIAGNOSIS — D72.829 LEUKOCYTOSIS, UNSPECIFIED TYPE: ICD-10-CM

## 2024-12-30 DIAGNOSIS — D70.9 NEUTROPENIA, UNSPECIFIED (H): Primary | ICD-10-CM

## 2024-12-30 DIAGNOSIS — L30.9 DERMATITIS: ICD-10-CM

## 2024-12-30 DIAGNOSIS — D72.810 LYMPHOPENIA: ICD-10-CM

## 2025-01-07 ENCOUNTER — LAB (OUTPATIENT)
Dept: LAB | Facility: CLINIC | Age: 40
End: 2025-01-07
Payer: COMMERCIAL

## 2025-01-07 DIAGNOSIS — D72.829 LEUKOCYTOSIS, UNSPECIFIED TYPE: ICD-10-CM

## 2025-01-07 DIAGNOSIS — D70.9 NEUTROPENIA, UNSPECIFIED: ICD-10-CM

## 2025-01-07 DIAGNOSIS — D72.810 LYMPHOPENIA: ICD-10-CM

## 2025-01-07 DIAGNOSIS — L30.9 DERMATITIS: ICD-10-CM

## 2025-01-07 LAB
BASOPHILS # BLD AUTO: 0 10E3/UL (ref 0–0.2)
BASOPHILS NFR BLD AUTO: 1 %
CRP SERPL-MCNC: <3 MG/L
EOSINOPHIL # BLD AUTO: 0.1 10E3/UL (ref 0–0.7)
EOSINOPHIL NFR BLD AUTO: 2 %
ERYTHROCYTE [DISTWIDTH] IN BLOOD BY AUTOMATED COUNT: 12.3 % (ref 10–15)
ERYTHROCYTE [SEDIMENTATION RATE] IN BLOOD BY WESTERGREN METHOD: 16 MM/HR (ref 0–15)
HCT VFR BLD AUTO: 44.8 % (ref 40–53)
HGB BLD-MCNC: 14.9 G/DL (ref 13.3–17.7)
IMM GRANULOCYTES # BLD: 0 10E3/UL
IMM GRANULOCYTES NFR BLD: 0 %
LYMPHOCYTES # BLD AUTO: 0.8 10E3/UL (ref 0.8–5.3)
LYMPHOCYTES NFR BLD AUTO: 26 %
MCH RBC QN AUTO: 29.5 PG (ref 26.5–33)
MCHC RBC AUTO-ENTMCNC: 33.3 G/DL (ref 31.5–36.5)
MCV RBC AUTO: 89 FL (ref 78–100)
MONOCYTES # BLD AUTO: 0.5 10E3/UL (ref 0–1.3)
MONOCYTES NFR BLD AUTO: 17 %
NEUTROPHILS # BLD AUTO: 1.6 10E3/UL (ref 1.6–8.3)
NEUTROPHILS NFR BLD AUTO: 54 %
PLATELET # BLD AUTO: 292 10E3/UL (ref 150–450)
RBC # BLD AUTO: 5.05 10E6/UL (ref 4.4–5.9)
RETICS # AUTO: 0.06 10E6/UL
RETICS/RBC NFR AUTO: 1.1 %
WBC # BLD AUTO: 2.9 10E3/UL (ref 4–11)

## 2025-01-07 PROCEDURE — 99000 SPECIMEN HANDLING OFFICE-LAB: CPT

## 2025-01-07 PROCEDURE — 85652 RBC SED RATE AUTOMATED: CPT

## 2025-01-07 PROCEDURE — 99207 BLOOD MORPHOLOGY PATHOLOGIST REVIEW: CPT | Performed by: PATHOLOGY

## 2025-01-07 PROCEDURE — 85045 AUTOMATED RETICULOCYTE COUNT: CPT

## 2025-01-07 PROCEDURE — 85025 COMPLETE CBC W/AUTO DIFF WBC: CPT

## 2025-01-07 PROCEDURE — 82525 ASSAY OF COPPER: CPT | Mod: 90

## 2025-01-07 PROCEDURE — 82784 ASSAY IGA/IGD/IGG/IGM EACH: CPT

## 2025-01-07 PROCEDURE — 36415 COLL VENOUS BLD VENIPUNCTURE: CPT

## 2025-01-07 PROCEDURE — 86140 C-REACTIVE PROTEIN: CPT

## 2025-01-08 LAB
IGA SERPL-MCNC: 567 MG/DL (ref 84–499)
IGG SERPL-MCNC: 2514 MG/DL (ref 610–1616)
IGM SERPL-MCNC: 42 MG/DL (ref 35–242)
PATH REPORT.COMMENTS IMP SPEC: NORMAL
PATH REPORT.COMMENTS IMP SPEC: NORMAL
PATH REPORT.FINAL DX SPEC: NORMAL
PATH REPORT.MICROSCOPIC SPEC OTHER STN: NORMAL
PATH REPORT.MICROSCOPIC SPEC OTHER STN: NORMAL
PATH REPORT.RELEVANT HX SPEC: NORMAL

## 2025-01-09 LAB — COPPER SERPL-MCNC: 99.1 UG/DL

## 2025-02-08 ENCOUNTER — HEALTH MAINTENANCE LETTER (OUTPATIENT)
Age: 40
End: 2025-02-08